# Patient Record
Sex: FEMALE | Race: WHITE | Employment: OTHER | URBAN - METROPOLITAN AREA
[De-identification: names, ages, dates, MRNs, and addresses within clinical notes are randomized per-mention and may not be internally consistent; named-entity substitution may affect disease eponyms.]

---

## 2021-11-01 ENCOUNTER — HOSPITAL ENCOUNTER (OUTPATIENT)
Dept: MRI IMAGING | Age: 71
Discharge: HOME | End: 2021-11-01
Payer: MEDICARE

## 2021-11-01 DIAGNOSIS — M54.16 LUMBAR RADICULOPATHY: ICD-10-CM

## 2021-11-01 PROCEDURE — 72148 MRI LUMBAR SPINE W/O DYE: CPT

## 2022-02-14 ENCOUNTER — HOSPITAL ENCOUNTER (OUTPATIENT)
Dept: NEUROLOGY | Age: 72
Discharge: HOME | End: 2022-02-14
Payer: MEDICARE

## 2022-02-14 PROCEDURE — 95909 NRV CNDJ TST 5-6 STUDIES: CPT

## 2022-02-14 PROCEDURE — 95886 MUSC TEST DONE W/N TEST COMP: CPT

## 2022-02-14 NOTE — PROCEDURES
Test Date:  2022    Patient: Maria L Jasso : 1950 Physician: Jane Harmon DO   Sex: Female ID#:  Ref Phys: Lynette Du MD     Patient Complaints:  Patient is a 73year-old female who presents with numbness tingling in the feet left more severe since August.  + back pain. Patient History / Exam:  PMH:  no  back or leg surgery, no endocrine disease, borderline a1c PE:  reflexes 1+, normal strength     NCV & EMG Findings:  Evaluation of the left medial plantar (mixed) sensory and the right sural sensory nerves showed reduced amplitude (L6, R5 µV). The right medial plantar (mixed) sensory nerve showed no response. All remaining nerves (as indicated in the following tables) were within normal limits. All examined muscles (as indicated in the following table) showed no evidence of electrical instability. Impression:  Normal study without evidence of an acute radiculopathy entrapment neuropathy or generalized peripheral neuropathy.        Jane Harmon DO        Nerve Conduction Studies  Motor Nerve Results      Latency Amplitude F-Lat Segment Distance CV Comment   Site (ms) Norm (mV) Norm (ms)  (cm) (m/s) Norm    Left Fibular (EDB) Motor   Ankle 5.2  < 6.1 3.4  > 2.0         Bel Fib Head 12.1 - 3.5 -  Bel Fib Head-Ankle 30 43  > 38    Right Fibular (EDB) Motor   Ankle 4.6  < 6.1 3.7  > 2.0         Bel Fib Head 11.5 - 3.1 -  Bel Fib Head-Ankle 31 45  > 38    Left Tibial (AHB) Motor   Ankle 3.2  < 6.1 15.9  > 4.4         Right Tibial (AHB) Motor   Ankle 6.1  < 6.1 5.2  > 4.4           Sensory Nerve Results      Latency (Peak) Amplitude (P-P) Segment Distance CV Comment   Site (ms) Norm (µV) Norm  (cm) (m/s) Norm    Left Medial Plantar (Mixed) Sensory   Med Sole-Med Mall 2.8  < 3.7 6  > 10 Med Sole-Med Mall - - -    Right Medial Plantar (Mixed) Sensory   Med Sole-Med Mall NR  < 3.7 NR  > 10 Med Sole-Med Mall - NR -    Right Sural Sensory   Calf-Lat Mall 3.6  < 4.0 5  > 5 Calf-Lat Mall 14 39  > 35        Electromyography     Side Muscle Nerve Root Ins Act Fibs Psw Amp Dur Poly Recrt Int Antonetta Horn Comment   Right Gluteus Med Sup Gluteal L5-S1 Nml Nml Nml Nml Nml 0 Nml Nml    Right Vastus Med Femoral L2-L4 Nml Nml Nml Nml Nml 0 Nml Nml    Right Add Longus Obturator L2-L4 Nml Nml Nml Nml Nml 0 Nml Nml    Right Tib Anterior Deep Fibular,  Fibula. .. L4-L5 Nml Nml Nml Nml Nml 0 Nml Nml    Right Fib longus  L5-S1 Nml Nml Nml Nml Nml 0 Nml Nml    Right Gastroc MH Tibial S1-S2 Nml Nml Nml Nml Nml 0 Nml Nml    Right Ext [place] Long Deep Fibular,  Fibula. .. L5-S1 Nml Nml Nml Nml Nml 0 Nml Nml    Right EDB Deep Fibular,  Fibula. .. L5-S1 Nml Nml Nml Nml Nml 0 Nml Nml    Right AHB Medial Plantar,  Tibi. .. S1-S2 Nml Nml Nml Nml Nml 0 Nml Nml    Right Lumbo Paraspinal (Upper) Rami L1-L2 Nml Nml Nml         Right Lumbo Paraspinal (Mid) Rami L3-L4 Nml Nml Nml         Right Lumbo Paraspinal (Lower) Rami L5-S1 Nml Nml Nml         Left Gluteus Med Sup Gluteal L5-S1 Nml Nml Nml Nml Nml 0 Nml Nml    Left Vastus Med Femoral L2-L4 Nml Nml Nml Nml Nml 0 Nml Nml    Left Add Longus Obturator L2-L4 Nml Nml Nml Nml Nml 0 Nml Nml    Left Tib Anterior Deep Fibular,  Fibula. .. L4-L5 Nml Nml Nml Nml Nml 0 Nml Nml    Left Fib longus  L5-S1 Nml Nml Nml Nml Nml 0 Nml Nml    Left Gastroc MH Tibial S1-S2 Nml Nml Nml Nml Nml 0 Nml Nml    Left Ext [place] Long Deep Fibular,  Fibula. .. L5-S1 Nml Nml Nml Nml Nml 0 Nml Nml    Left EDB Deep Fibular,  Fibula. .. L5-S1 Nml Nml Nml Nml Nml 0 Nml Nml    Left AHB Medial Plantar,  Tibi. ..  S1-S2 Nml Nml Nml Nml Nml 0 Nml Nml    Left Lumbo Paraspinal (Upper) Rami L1-L2 Nml Nml Nml         Left Lumbo Paraspinal (Mid) Rami L3-L4 Nml Nml Nml         Left Lumbo Paraspinal (Lower) Rami L5-S1 Nml Nml Nml             Electronically signed by Thelma Driver DO on 2/14/2022 at 10:26 AM

## 2022-03-18 PROBLEM — D12.6 BENIGN NEOPLASM OF COLON: Status: ACTIVE | Noted: 2018-08-27

## 2022-03-18 PROBLEM — R94.31 ABNORMAL ECG: Status: ACTIVE | Noted: 2017-04-03

## 2022-03-19 PROBLEM — M19.90 OA (OSTEOARTHRITIS): Status: ACTIVE | Noted: 2018-08-27

## 2022-03-19 PROBLEM — G47.33 OBSTRUCTIVE SLEEP APNEA: Status: ACTIVE | Noted: 2017-04-03

## 2022-03-19 PROBLEM — M50.30 DDD (DEGENERATIVE DISC DISEASE), CERVICAL: Status: ACTIVE | Noted: 2018-08-27

## 2022-03-20 PROBLEM — R53.82 CHRONIC FATIGUE: Status: ACTIVE | Noted: 2017-04-03

## 2022-03-20 PROBLEM — R51.9 CEPHALGIA: Status: ACTIVE | Noted: 2018-08-27
